# Patient Record
Sex: FEMALE | Race: BLACK OR AFRICAN AMERICAN | Employment: FULL TIME | URBAN - METROPOLITAN AREA
[De-identification: names, ages, dates, MRNs, and addresses within clinical notes are randomized per-mention and may not be internally consistent; named-entity substitution may affect disease eponyms.]

---

## 2024-10-31 ENCOUNTER — OFFICE VISIT (OUTPATIENT)
Age: 54
End: 2024-10-31
Payer: COMMERCIAL

## 2024-10-31 VITALS
BODY MASS INDEX: 45.99 KG/M2 | WEIGHT: 293 LBS | TEMPERATURE: 97 F | OXYGEN SATURATION: 96 % | HEIGHT: 67 IN | HEART RATE: 60 BPM | SYSTOLIC BLOOD PRESSURE: 128 MMHG | DIASTOLIC BLOOD PRESSURE: 66 MMHG

## 2024-10-31 DIAGNOSIS — R94.31 ABNORMAL ECG: ICD-10-CM

## 2024-10-31 DIAGNOSIS — E66.01 CLASS 3 SEVERE OBESITY DUE TO EXCESS CALORIES WITHOUT SERIOUS COMORBIDITY WITH BODY MASS INDEX (BMI) OF 40.0 TO 44.9 IN ADULT: ICD-10-CM

## 2024-10-31 DIAGNOSIS — E66.813 CLASS 3 SEVERE OBESITY DUE TO EXCESS CALORIES WITHOUT SERIOUS COMORBIDITY WITH BODY MASS INDEX (BMI) OF 40.0 TO 44.9 IN ADULT: ICD-10-CM

## 2024-10-31 DIAGNOSIS — I10 PRIMARY HYPERTENSION: Primary | ICD-10-CM

## 2024-10-31 DIAGNOSIS — I51.89 DIASTOLIC DYSFUNCTION: ICD-10-CM

## 2024-10-31 DIAGNOSIS — R01.1 SYSTOLIC MURMUR: ICD-10-CM

## 2024-10-31 DIAGNOSIS — E78.00 HYPERCHOLESTEREMIA: ICD-10-CM

## 2024-10-31 DIAGNOSIS — G47.33 OBSTRUCTIVE SLEEP APNEA: ICD-10-CM

## 2024-10-31 PROCEDURE — 99204 OFFICE O/P NEW MOD 45 MIN: CPT | Performed by: INTERNAL MEDICINE

## 2024-10-31 PROCEDURE — 3074F SYST BP LT 130 MM HG: CPT | Performed by: INTERNAL MEDICINE

## 2024-10-31 PROCEDURE — 3078F DIAST BP <80 MM HG: CPT | Performed by: INTERNAL MEDICINE

## 2024-10-31 RX ORDER — M-VIT,TX,IRON,MINS/CALC/FOLIC 27MG-0.4MG
1 TABLET ORAL DAILY
COMMUNITY

## 2024-10-31 RX ORDER — HYDROCHLOROTHIAZIDE 12.5 MG/1
12.5 TABLET ORAL DAILY
COMMUNITY
Start: 2024-10-04 | End: 2024-10-31 | Stop reason: SDUPTHER

## 2024-10-31 RX ORDER — HYDROCHLOROTHIAZIDE 12.5 MG/1
12.5 TABLET ORAL DAILY
Qty: 90 TABLET | Refills: 3 | Status: SHIPPED | OUTPATIENT
Start: 2024-10-31

## 2024-10-31 RX ORDER — TRIAMCINOLONE ACETONIDE 1 MG/G
CREAM TOPICAL
COMMUNITY

## 2024-10-31 RX ORDER — ROSUVASTATIN CALCIUM 5 MG/1
5 TABLET, COATED ORAL DAILY
Qty: 90 TABLET | Refills: 3 | Status: SHIPPED | OUTPATIENT
Start: 2024-10-31

## 2024-10-31 RX ORDER — METHOCARBAMOL 750 MG/1
TABLET, FILM COATED ORAL
COMMUNITY

## 2024-10-31 RX ORDER — IRON PS COMPLEX/B12/FOLIC ACID 150-25-1
CAPSULE ORAL
COMMUNITY
Start: 2023-11-15

## 2024-10-31 RX ORDER — ROSUVASTATIN CALCIUM 5 MG/1
TABLET, COATED ORAL
COMMUNITY
End: 2024-10-31 | Stop reason: SDUPTHER

## 2024-10-31 ASSESSMENT — ENCOUNTER SYMPTOMS
ALLERGIC/IMMUNOLOGIC NEGATIVE: 1
EYES NEGATIVE: 1
SHORTNESS OF BREATH: 0
GASTROINTESTINAL NEGATIVE: 1

## 2024-10-31 NOTE — PROGRESS NOTES
1. \"Have you been to the ER, urgent care clinic since your last visit?  Hospitalized since your last visit?\" Reviewed by Dr. Kyle Dimas    2. \"Have you seen or consulted any other health care providers outside of the [facility] since your last visit?\" Reviewed by Dr. Kyle Dimas

## 2024-10-31 NOTE — PROGRESS NOTES
Nicole Arroyo (:  1970) is a 54 y.o. female,New patient, here for evaluation of the following chief complaint(s):  New Patient    Subjective   SUBJECTIVE/OBJECTIVE:    History of Present Illness  The patient presents for evaluation of multiple medical concerns.    She is currently in the perimenopausal phase and has experienced significant weight gain. Approximately 1.5 years ago, her OB-GYN noted slightly elevated blood pressure. Her primary care physician found her cholesterol level to be 223, a significant increase from her usual range of 160 to 170. She was prescribed a statin, amlodipine, and hydrochlorothiazide. However, she experienced swelling and lightheadedness with amlodipine, so she discontinued it and has been managing her blood pressure with just hydrochlorothiazide. Her blood pressure readings have been consistent, ranging from 117/70 to 128/70. She is not diabetic or prediabetic, but her red blood cell count is slightly elevated. Her oncologist is monitoring this due to her history of low iron during perimenopause. She has no history of cancer. She is currently participating in walking challenges, covering 2 to 3 miles a day, and is on a new product called [employer]. Despite these efforts, she has not seen any significant results. She maintains a healthy diet, avoiding high-sodium foods and consuming a lot of protein and vegetables. She also participates in a walking challenge at work, aiming to walk 33 miles in a month.    She has sleep apnea and uses a CPAP machine nightly. Her dentist noted that her airways are smaller than average, which may contribute to her sleep apnea. She is scheduled to attend a breathing class to learn how to breathe without using her mouth.    No past medical history on file.     No past surgical history on file.     Allergies   Allergen Reactions    Penicillin G Hives     Tolerated 2g Ancef 3/15/2024        Current Outpatient Medications   Medication Sig Dispense

## 2025-06-24 ENCOUNTER — OFFICE VISIT (OUTPATIENT)
Age: 55
End: 2025-06-24
Payer: COMMERCIAL

## 2025-06-24 VITALS
SYSTOLIC BLOOD PRESSURE: 128 MMHG | BODY MASS INDEX: 45.99 KG/M2 | HEART RATE: 89 BPM | DIASTOLIC BLOOD PRESSURE: 66 MMHG | WEIGHT: 293 LBS | OXYGEN SATURATION: 99 % | TEMPERATURE: 97 F | HEIGHT: 67 IN

## 2025-06-24 DIAGNOSIS — R60.0 BILATERAL LOWER EXTREMITY EDEMA: ICD-10-CM

## 2025-06-24 DIAGNOSIS — E78.00 HYPERCHOLESTEROLEMIA: ICD-10-CM

## 2025-06-24 DIAGNOSIS — I51.89 DIASTOLIC DYSFUNCTION: ICD-10-CM

## 2025-06-24 DIAGNOSIS — R94.31 ABNORMAL EKG: ICD-10-CM

## 2025-06-24 DIAGNOSIS — I10 PRIMARY HYPERTENSION: Primary | ICD-10-CM

## 2025-06-24 PROCEDURE — 3078F DIAST BP <80 MM HG: CPT

## 2025-06-24 PROCEDURE — 93000 ELECTROCARDIOGRAM COMPLETE: CPT

## 2025-06-24 PROCEDURE — 99215 OFFICE O/P EST HI 40 MIN: CPT

## 2025-06-24 PROCEDURE — 3074F SYST BP LT 130 MM HG: CPT

## 2025-06-24 RX ORDER — GLUCOSAMINE/MSM/CHONDROITIN A 500-83-400
TABLET ORAL
COMMUNITY

## 2025-06-24 RX ORDER — ESTRADIOL 0.1 MG/G
CREAM VAGINAL
COMMUNITY
Start: 2025-06-17

## 2025-06-24 RX ORDER — TIRZEPATIDE 2.5 MG/.5ML
2.5 INJECTION, SOLUTION SUBCUTANEOUS
COMMUNITY
Start: 2025-06-13

## 2025-06-24 RX ORDER — OMEGA-3-ACID ETHYL ESTERS 1 G/1
1000 CAPSULE, LIQUID FILLED ORAL DAILY
COMMUNITY

## 2025-06-24 ASSESSMENT — ENCOUNTER SYMPTOMS
SHORTNESS OF BREATH: 0
ABDOMINAL PAIN: 0
NAUSEA: 0
SORE THROAT: 0
RHINORRHEA: 0
DIARRHEA: 0
WHEEZING: 0
VOMITING: 0
COUGH: 0

## 2025-06-24 ASSESSMENT — PATIENT HEALTH QUESTIONNAIRE - PHQ9
SUM OF ALL RESPONSES TO PHQ QUESTIONS 1-9: 0
2. FEELING DOWN, DEPRESSED OR HOPELESS: NOT AT ALL
1. LITTLE INTEREST OR PLEASURE IN DOING THINGS: NOT AT ALL

## 2025-06-24 NOTE — PROGRESS NOTES
Nicole Arroyo (:  1970) is a 55 y.o. female, with history significant for HTN, abnormal EKG, HLD, TAMAR, diastolic dysfunction who presents for overdue follow up.     History of Present Illness  She has been making efforts to maintain an active lifestyle, including participating in walking challenges. Despite these efforts and dietary modifications such as reducing sugar and carbohydrate intake and increasing fiber consumption, she has not observed any significant weight loss. Her primary care physician prescribed Zepbound, but she has not yet started the medication. She is currently not prediabetic but is concerned about her abdominal fat accumulation. She is experiencing perimenopausal hormonal fluctuations, including elevated testosterone levels. Her red blood cell count is slightly elevated at 5.27, and her thyroid function is normal. She has been advised to increase her water intake and physical activity. She is also on a CPAP machine and was advised to lose weight to discontinue its use. She reports no difficulty in walking. She has gained 30 pounds in the past 6 months. She has a treadmill next to her work desk and walks on it. She has a Fitbit and an Apple watch and monitors her heart rate while walking, which can reach 120 to 125. Her resting heart rate ranges from 77 to 79. She is taking CoQ10 because of the statin, omega 3, 6, 9, vitamin C, vitamin D3 with K2, and a multivitamin. She takes Rhodiola during the day for stress. She has cut out all alcohol and has not had a drink in 6 months. She cannot tolerate alcohol or sugar due to perimenopause. Sugar causes hot flashes for her. She occasionally wears compression socks when she experiences leg fatigue, particularly after extensive walking or in hot weather. She has been incorporating avocados and nuts into her diet to increase her HDL cholesterol levels.    Her cholesterol level was 150 at her last blood test almost a year ago. She is concerned

## 2025-06-24 NOTE — PROGRESS NOTES
1. \"Have you been to the ER, urgent care clinic since your last visit?  Hospitalized since your last visit?\" Reviewed by ASIM Upton    2. \"Have you seen or consulted any other health care providers outside of the [facility] since your last visit?\" Reviewed by  ASIM Upton